# Patient Record
Sex: FEMALE | Race: WHITE | Employment: FULL TIME | URBAN - METROPOLITAN AREA
[De-identification: names, ages, dates, MRNs, and addresses within clinical notes are randomized per-mention and may not be internally consistent; named-entity substitution may affect disease eponyms.]

---

## 2018-07-19 ENCOUNTER — OFFICE VISIT (OUTPATIENT)
Dept: DERMATOLOGY | Facility: CLINIC | Age: 23
End: 2018-07-19
Payer: COMMERCIAL

## 2018-07-19 DIAGNOSIS — Z12.83 SKIN CANCER SCREENING: ICD-10-CM

## 2018-07-19 DIAGNOSIS — L24.9 IRRITANT DERMATITIS: ICD-10-CM

## 2018-07-19 DIAGNOSIS — D48.5 NEOPLASM OF UNCERTAIN BEHAVIOR OF SKIN: Primary | ICD-10-CM

## 2018-07-19 DIAGNOSIS — D22.9 MULTIPLE PIGMENTED NEVI: ICD-10-CM

## 2018-07-19 RX ORDER — DESVENLAFAXINE 100 MG/1
100 TABLET, EXTENDED RELEASE ORAL DAILY
COMMUNITY

## 2018-07-19 RX ORDER — DESONIDE 0.5 MG/G
OINTMENT TOPICAL 2 TIMES DAILY
Qty: 60 G | Refills: 0 | Status: SHIPPED | OUTPATIENT
Start: 2018-07-19 | End: 2018-11-15

## 2018-07-19 ASSESSMENT — PAIN SCALES - GENERAL
PAINLEVEL: MODERATE PAIN (4)
PAINLEVEL: NO PAIN (0)

## 2018-07-19 NOTE — LETTER
7/19/2018       RE: Clarisa Garner  [address]  [facility] [address]     Dear Colleague,    Thank you for referring your patient, Clarisa Garner, to the [facility] DERMATOLOGY at [facility]. Please see a copy of my visit note below.    [facility] Dermatology Note      Dermatology Problem List:  1. Last total body skin exam: 7/19/2018  2. Dermatitis, axillae, desonide 0.05% ointment  3. NUB, right axilla- s/p bx 7/19/2018    Encounter Date: Jul 19, 2018    CC:  Chief Complaint   Patient presents with     Derm Problem     Clarisa is here for a mole check and also notes an irritation under her arms. She states that the irritation started around 3 weeks ago.           History of Present Illness:  Ms. Clarisa Garner is a 23 year old female, with no family history of skin cancer, who presents in self referral for a mole check as a new patient. She admits to using a tanning bed twice in high school and an average amount of sun exposure over her life time.     Today the patient reports a lesion of concern under her arms. She reports this mole has gotten more red and raised recently. This changed occurred roughly 3 weeks ago. She also reports her arm pits have gotten red and itchy recently well. She also states they have started to smell more than usual. She denies starting any new beauty products or deodorants.     Otherwise the patient reports no additional painful, bleeding, nonhealing or pruritic lesions and denies any new or changing moles.    Past Medical History:   There is no problem list on file for this patient.    History reviewed. No pertinent past medical history.  History reviewed. No pertinent surgical history.    Social History:  The patient works in [employer]. She recently graduated from [email or web address] in June with degrees in political science and women's studies..  The patient admits to using a tanning bed twice in high school. Reports she has  gotten an average amount of sun exposure over her lifetime.     Family History:  There is no family history of skin cancer.    Medications:  Current Outpatient Prescriptions   Medication Sig Dispense Refill     desvenlafaxine (KHEDEZLA) 100 MG 24 hr tablet Take 100 mg by mouth daily       TRAZODONE HCL PO Take 150 mg by mouth At Bedtime         Not on File    Review of Systems:  -Skin/Heme New Pt: The patient denies frequent sun exposure.  -Constitutional: The patient is feeling generally well   -Skin: As above in HPI. No additional skin concerns.    Physical exam:  Vitals: There were no vitals taken for this visit.  GEN: This is a well developed, well-nourished female in no acute distress, in a pleasant mood.    Answers for HPI/ROS submitted by the patient on 7/19/2018   PHQ-2 Score: 2  SKIN: Total skin excluding the undergarment areas was performed. The exam included the head/face, neck, both arms, chest, back, abdomen, both legs, digits and/or nails.   -  There are well circumscribed, symmetric tan to brown pigmented macules and papules on the trunk and extremities.   - There are pink eczematous scaly plaques axillae bilaterally   - There is  5 mm brown and pink papule on the right medial axilla    - Negative woods lamp exam   - No other lesions of concern on areas examined.       Impression/Plan:  1. Neoplasm of uncertain behavior on the right medial axilla. The differential diagnosis includes inflamed nevus vs dysplastic nevus vs other     Shave biopsy:  After discussion of benefits and risks including but not limited to bleeding/bruising, pain/swelling, infection, scar, incomplete removal, nerve damage/numbness, recurrence, and non-diagnostic biopsy, written consent, verbal consent and photographs were obtained. Time-out was performed. The area was cleaned with isopropyl alcohol.  was injected to obtain adequate anesthesia of the lesion on the right medial axilla. 0.5ml of 1% lidocaine was injected to obtain  adequate anesthesia. A  shave biopsy was performed. Hemostasis was achieved with aluminium chloride. Vaseline and a sterile dressing were applied. The patient tolerated the procedure and no complications were noted. The patient was provided with verbal and written post care instructions.     2. Frictional irritant dermatitis, axilla     Start desonide 0.05% ointment, apply to affected areas under arms and arm pits BID for two weeks. Take a one week break before restarting another two week cycle as needed. Steroid ed given.     Discussed using unscented moisturizers and emollients regularly after showering    3. Multiple clinically benign nevi on trunk and extremities    ABCD's of melanoma were reviewed with patient and handout provided.     Sun precaution was advised including the use of sun screens of SPF 30 or higher, sun protective clothing, and avoidance of tanning beds.    Follow-up in 1 year, earlier pending biopsy results or for new or changing lesions. Pt defers follow up for irritant dermatitis at this time.       Staff Involved:  Scribe/Staff    Scribe Disclosure:   Lydia OLSON, am serving as a scribe to document services personally performed by Pushpa Amin PA-C, based on data collection and the provider's statements to me.    Provider Disclosure:   The documentation recorded by the scribe accurately reflects the services I personally performed and the decisions made by me.    All risks, benefits and alternatives were discussed with patient.  Patient is in agreement and understands the assessment and plan.  All questions were answered.  Sun Screen Education was given.   Return to Clini cannually or sooner as needed.   Pushpa Amin PA-C   [facility]               Again, thank you for allowing me to participate in the care of your patient.      Sincerely,    Pushpa Amin PA-C

## 2018-07-19 NOTE — PROGRESS NOTES
[facility] Dermatology Note      Dermatology Problem List:  1. Last total body skin exam: 7/19/2018  2. Dermatitis, axillae, desonide 0.05% ointment  3. NUB, right axilla- s/p bx 7/19/2018    Encounter Date: Jul 19, 2018    CC:  Chief Complaint   Patient presents with     Derm Problem     Clarisa is here for a mole check and also notes an irritation under her arms. She states that the irritation started around 3 weeks ago.           History of Present Illness:  Ms. Clarisa Garner is a 23 year old female, with no family history of skin cancer, who presents in self referral for a mole check as a new patient. She admits to using a tanning bed twice in high school and an average amount of sun exposure over her life time.     Today the patient reports a lesion of concern under her arms. She reports this mole has gotten more red and raised recently. This changed occurred roughly 3 weeks ago. She also reports her arm pits have gotten red and itchy recently well. She also states they have started to smell more than usual. She denies starting any new beauty products or deodorants.     Otherwise the patient reports no additional painful, bleeding, nonhealing or pruritic lesions and denies any new or changing moles.    Past Medical History:   There is no problem list on file for this patient.    History reviewed. No pertinent past medical history.  History reviewed. No pertinent surgical history.    Social History:  The patient works in [employer]. She recently graduated from [employer] in June with degrees in political science and women's studies..  The patient admits to using a tanning bed twice in high school. Reports she has gotten an average amount of sun exposure over her lifetime.     Family History:  There is no family history of skin cancer.    Medications:  Current Outpatient Prescriptions   Medication Sig Dispense Refill     desvenlafaxine (KHEDEZLA) 100 MG 24 hr tablet Take 100 mg by mouth daily        TRAZODONE HCL PO Take 150 mg by mouth At Bedtime         Not on File    Review of Systems:  -Skin/Heme New Pt: The patient denies frequent sun exposure.  -Constitutional: The patient is feeling generally well   -Skin: As above in HPI. No additional skin concerns.    Physical exam:  Vitals: There were no vitals taken for this visit.  GEN: This is a well developed, well-nourished female in no acute distress, in a pleasant mood.    Answers for HPI/ROS submitted by the patient on 7/19/2018   PHQ-2 Score: 2  SKIN: Total skin excluding the undergarment areas was performed. The exam included the head/face, neck, both arms, chest, back, abdomen, both legs, digits and/or nails.   -  There are well circumscribed, symmetric tan to brown pigmented macules and papules on the trunk and extremities.   - There are pink eczematous scaly plaques axillae bilaterally   - There is  5 mm brown and pink papule on the right medial axilla    - Negative woods lamp exam   - No other lesions of concern on areas examined.       Impression/Plan:  1. Neoplasm of uncertain behavior on the right medial axilla. The differential diagnosis includes inflamed nevus vs dysplastic nevus vs other     Shave biopsy:  After discussion of benefits and risks including but not limited to bleeding/bruising, pain/swelling, infection, scar, incomplete removal, nerve damage/numbness, recurrence, and non-diagnostic biopsy, written consent, verbal consent and photographs were obtained. Time-out was performed. The area was cleaned with isopropyl alcohol.  was injected to obtain adequate anesthesia of the lesion on the right medial axilla. 0.5ml of 1% lidocaine was injected to obtain adequate anesthesia. A  shave biopsy was performed. Hemostasis was achieved with aluminium chloride. Vaseline and a sterile dressing were applied. The patient tolerated the procedure and no complications were noted. The patient was provided with verbal and written post care  instructions.     2. Frictional irritant dermatitis, axilla     Start desonide 0.05% ointment, apply to affected areas under arms and arm pits BID for two weeks. Take a one week break before restarting another two week cycle as needed. Steroid ed given.     Discussed using unscented moisturizers and emollients regularly after showering    3. Multiple clinically benign nevi on trunk and extremities    ABCD's of melanoma were reviewed with patient and handout provided.     Sun precaution was advised including the use of sun screens of SPF 30 or higher, sun protective clothing, and avoidance of tanning beds.    Follow-up in 1 year, earlier pending biopsy results or for new or changing lesions. Pt defers follow up for irritant dermatitis at this time.       Staff Involved:  Scribe/Staff    Scribe Disclosure:   ILydia, am serving as a scribe to document services personally performed by Pushpa Amin PA-C, based on data collection and the provider's statements to me.    Provider Disclosure:   The documentation recorded by the scribe accurately reflects the services I personally performed and the decisions made by me.    All risks, benefits and alternatives were discussed with patient.  Patient is in agreement and understands the assessment and plan.  All questions were answered.  Sun Screen Education was given.   Return to Clini cannually or sooner as needed.   Pushpa Amin PA-C   [facility]

## 2018-07-19 NOTE — MR AVS SNAPSHOT
After Visit Summary   7/19/2018    Clarisa Garner    MRN: [id number]           Patient Information     Date Of Birth          1995        Visit Information        Provider Department      7/19/2018 11:30 AM Pushpa Amin PA-C [facility] Dermatology        Today's Diagnoses     Neoplasm of uncertain behavior of skin    -  1    Skin cancer screening        Multiple pigmented nevi        Irritant dermatitis          Care Instructions    Wound Care After a Biopsy    What is a skin biopsy?  A skin biopsy allows the doctor to examine a very small piece of tissue under the microscope to determine the diagnosis and the best treatment for the skin condition. A local anesthetic (numbing medicine)  is injected with a very small needle into the skin area to be tested. A small piece of skin is taken from the area. Sometimes a suture (stitch) is used.     What are the risks of a skin biopsy?  I will experience scar, bleeding, swelling, pain, crusting and redness. I may experience incomplete removal or recurrence. Risks of this procedure are excessive bleeding, bruising, infection, nerve damage, numbness, thick (hypertrophic or keloidal) scar and non-diagnostic biopsy.    How should I care for my wound for the first 24 hours?    Keep the wound dry and covered for 24 hours    If it bleeds, hold direct pressure on the area for 15 minutes. If bleeding does not stop then go to the emergency room    Avoid strenuous exercise the first 1-2 days or as your doctor instructs you    How should I care for the wound after 24 hours?    After 24 hours, remove the bandage    You may bathe or shower as normal    If you had a scalp biopsy, you can shampoo as usual and can use shower water to clean the biopsy site daily    Clean the wound twice a day with gentle soap and water    Do not scrub, be gentle    Apply white petroleum/Vaseline after cleaning the wound with a cotton swab or a clean finger, and keep the site  covered with a Bandaid /bandage. Bandages are not necessary with a scalp biopsy    If you are unable to cover the site with a Bandaid /bandage, re-apply ointment 2-3 times a day to keep the site moist. Moisture will help with healing    Avoid strenuous activity for first 1-2 days    Avoid lakes, rivers, pools, and oceans until the stitches are removed or the site is healed    How do I clean my wound?    Wash hands thoroughly with soap or use hand  before all wound care    Clean the wound with gentle soap and water    Apply white petroleum/Vaseline  to wound after it is clean    Replace the Bandaid /bandage to keep the wound covered for the first few days or as instructed by your doctor    If you had a scalp biopsy, warm shower water to the area on a daily basis should suffice    What should I use to clean my wound?     Cotton-tipped applicators (Qtips )    White petroleum jelly (Vaseline ). Use a clean new container and use Q-tips to apply.    Bandaids   as needed    Gentle soap     How should I care for my wound long term?    Do not get your wound dirty    Keep up with wound care for one week or until the area is healed.    A small scab will form and fall off by itself when the area is completely healed. The area will be red and will become pink in color as it heals. Sun protection is very important for how your scar will turn out. Sunscreen with an SPF 30 or greater is recommended once the area is healed.    If you have stitches, stitches need to be removed in  days. You may return to our clinic for this or you may have it done locally at your doctor s office.    You should have some soreness but it should be mild and slowly go away over several days. Talk to your doctor about using tylenol for pain,    When should I call my doctor?  If you have increased:     Pain or swelling    Pus or drainage (clear or slightly yellow drainage is ok)    Temperature over 100F    Spreading redness or warmth around  wound    When will I hear about my results?  The biopsy results can take 2-3 weeks to come back. The clinic will call you with the results, send you a [facility] message, or have you schedule a follow-up clinic or phone time to discuss the results. Contact our clinics if you do not hear from us in 3 weeks.     Who should I call with questions?    [facility]: [phone]     [facility]: [phone]    For urgent needs outside of business hours call the [facility] at [phone] and ask for the dermatology resident on call              Follow-ups after your visit        Who to contact     Please call your clinic at [phone] to:    Ask questions about your health    Make or cancel appointments    Discuss your medicines    Learn about your test results    Speak to your doctor            Additional Information About Your Visit        MyChart Information     [employer]Med is an electronic gateway that provides easy, online access to your medical records. With [employer], you can request a clinic appointment, read your test results, renew a prescription or communicate with your care team.     To sign up for [employer] visit the website at [email or web address]   You will be asked to enter the access code listed below, as well as some personal information. Please follow the directions to create your username and password.     Your access code is: [email or web address]  Expires: 10/3/2018  5:16 PM     Your access code will  in 90 days. If you need help or a new code, please contact your [facility] or call [phone] for assistance.        Care EveryWhere ID     This is your Care EveryWhere ID. This could be used by other organizations to access your [facility] medical records  [id number]         Blood Pressure from Last 3 Encounters:   No data found for BP    Weight from Last 3 Encounters:   No data found for Wt              We  Performed the Following     BIOPSY SKIN/SUBQ/MUC MEM, SINGLE LESION     Dermatological path order and indications          Today's Medication Changes          These changes are accurate as of 7/19/18 11:59 AM.  If you have any questions, ask your nurse or doctor.               Start taking these medicines.        Dose/Directions    desonide 0.05 % ointment   Commonly known as:  DESOWEN   Used for:  Irritant dermatitis   Started by:  Pushpa Amin PA-C        Apply topically 2 times daily To affected areas for two weeks. Take a week break and restart as needed.   Quantity:  60 g   Refills:  0            Where to get your medicines      These medications were sent to [facility] [address] [place] [address] AT [address] [address]    Hours:  24-hours Phone:  [phone]     desonide 0.05 % ointment                Primary Care Provider    None Specified       No primary provider on file.        Equal Access to Services     [facility] : Hadii francisco garzao Shannon, waaxda lujuan, qaybta kaalmada adeesperanzayajaymie, ollie negrete . So [facility] [phone].    ATENCIÓN: Si habla español, tiene a yuan disposición servicios gratuitos de asistencia lingüística. Olaf al [phone].    We comply with applicable federal civil rights laws and [place] laws. We do not discriminate on the basis of race, color, national origin, age, disability, sex, sexual orientation, or gender identity.            Thank you!     Thank you for choosing [facility] DERMATOLOGY  for your care. Our goal is always to provide you with excellent care. Hearing back from our patients is one way we can continue to improve our services. Please take a few minutes to complete the written survey that you may receive in the mail after your visit with us. Thank you!             Your Updated Medication List - Protect others around you: Learn how to safely use, store and throw  away your medicines at www.disposemymeds.org.          This list is accurate as of 7/19/18 11:59 AM.  Always use your most recent med list.                   Brand Name Dispense Instructions for use Diagnosis    desonide 0.05 % ointment    DESOWEN    60 g    Apply topically 2 times daily To affected areas for two weeks. Take a week break and restart as needed.    Irritant dermatitis       desvenlafaxine 100 MG 24 hr tablet    KHEDEZLA     Take 100 mg by mouth daily        TRAZODONE HCL PO      Take 150 mg by mouth At Bedtime

## 2018-07-19 NOTE — NURSING NOTE
Chief Complaint   Patient presents with     Derm Problem     Clarisa is here for a mole check and also notes an irritation under her arms. She states that the irritation started around 3 weeks ago.      Susan Aguilar, EMT

## 2018-07-19 NOTE — PATIENT INSTRUCTIONS

## 2018-07-24 LAB — COPATH REPORT: NORMAL

## 2018-07-25 ENCOUNTER — TELEPHONE (OUTPATIENT)
Dept: DERMATOLOGY | Facility: CLINIC | Age: 23
End: 2018-07-25

## 2018-07-25 NOTE — TELEPHONE ENCOUNTER
I spoke to Clarisa Garner and gave results. Patient understood and had no further questions or concerns.

## 2018-07-25 NOTE — TELEPHONE ENCOUNTER
[facility] Call Center    Phone Message    May a detailed message be left on voicemail: yes    Reason for Call: Other: Pt got a call saying her biopsy results were back. She said to call her back today either before 11:30am or after 2pm.      Action Taken: Message routed to:  [facility] Dermatology

## 2018-10-17 ENCOUNTER — OFFICE VISIT (OUTPATIENT)
Dept: DERMATOLOGY | Facility: CLINIC | Age: 23
End: 2018-10-17
Payer: COMMERCIAL

## 2018-10-17 DIAGNOSIS — L85.3 XEROSIS OF SKIN: ICD-10-CM

## 2018-10-17 DIAGNOSIS — L24.9 IRRITANT DERMATITIS: Primary | ICD-10-CM

## 2018-10-17 ASSESSMENT — PAIN SCALES - GENERAL: PAINLEVEL: NO PAIN (0)

## 2018-10-17 NOTE — MR AVS SNAPSHOT
After Visit Summary   10/17/2018    Clarisa Garner    MRN: [id number]           Patient Information     Date Of Birth          1995        Visit Information        Provider Department      10/17/2018 6:15 PM Pushpa Amin PA-C [facility] Dermatology        Today's Diagnoses     Irritant dermatitis    -  1    Xerosis of skin           Follow-ups after your visit        Follow-up notes from your care team     Return if symptoms worsen or fail to improve.      Who to contact     Please call your clinic at [phone] to:    Ask questions about your health    Make or cancel appointments    Discuss your medicines    Learn about your test results    Speak to your doctor            Additional Information About Your Visit        MyChart Information     [employer] is an electronic gateway that provides easy, online access to your medical records. With [employer], you can request a clinic appointment, read your test results, renew a prescription or communicate with your care team.     To sign up for [employer] visit the website at [email or web address]   You will be asked to enter the access code listed below, as well as some personal information. Please follow the directions to create your username and password.     Your access code is: [email or web address]  Expires: 2018  9:12 AM     Your access code will  in 90 days. If you need help or a new code, please contact your [facility] or call [phone] for assistance.        Care EveryWhere ID     This is your Care EveryWhere ID. This could be used by other organizations to access your [place] medical records  [id number]         Blood Pressure from Last 3 Encounters:   No data found for BP    Weight from Last 3 Encounters:   No data found for Wt              Today, you had the following     No orders found for display       Primary Care Provider Fax #    Physician No Ref-Primary [phone]       No address on  file        Equal Access to Services     [facility] : Hadii aad ku hadfaustinonydia Shannon, wafrannyda luqadaha, qaybta lydiahenriquejaymie sandoval, olile duque. So [facility] [phone].    ATENCIÓN: Si habla español, tiene a yuan disposición servicios gratuitos de asistencia lingüística. Llame al [phone].    We comply with applicable federal civil rights laws and [place] laws. We do not discriminate on the basis of race, color, national origin, age, disability, sex, sexual orientation, or gender identity.            Thank you!     Thank you for choosing [facility] DERMATOLOGY  for your care. Our goal is always to provide you with excellent care. Hearing back from our patients is one way we can continue to improve our services. Please take a few minutes to complete the written survey that you may receive in the mail after your visit with us. Thank you!             Your Updated Medication List - Protect others around you: Learn how to safely use, store and throw away your medicines at www.disposemymeds.org.          This list is accurate as of 10/17/18 11:59 PM.  Always use your most recent med list.                   Brand Name Dispense Instructions for use Diagnosis    AMBIEN PO           desonide 0.05 % ointment    DESOWEN    60 g    Apply topically 2 times daily To affected areas for two weeks. Take a week break and restart as needed.    Irritant dermatitis       desvenlafaxine 100 MG 24 hr tablet    KHEDEZLA     Take 100 mg by mouth daily        levonorgestrel 20 MCG/24HR IUD    MIRENA     1 each by Intrauterine route once L arm        TRAZODONE HCL PO      Take 150 mg by mouth At Bedtime

## 2018-10-17 NOTE — LETTER
Date:October 19, 2018      Patient was self referred, no letter generated. Do not send.        [facility] Health Information

## 2018-10-17 NOTE — NURSING NOTE
Dermatology Rooming Note    Clarisa Garner's goals for this visit include:   Chief Complaint   Patient presents with     Derm Problem     Clarisa would like to discuss the dryness and irritation on her face and neck, she notes this problem is worse in the cold weather.      Ella Casanova LPN

## 2018-10-17 NOTE — LETTER
10/17/2018       RE: Clarisa Garner  [address]  [place] [address]     Dear Colleague,    Thank you for referring your patient, Clarisa Garner, to the [facility] DERMATOLOGY at [facility]. Please see a copy of my visit note below.    [facility] Dermatology Note      Dermatology Problem List:  1. Last total body skin exam: 7/19/2018  2. Dermatitis, axillae, desonide 0.05% ointment    Encounter Date: Oct 17, 2018    CC:  Chief Complaint   Patient presents with     Derm Problem     Clarisa would like to discuss the dryness and irritation on her face and neck, she notes this problem is worse in the cold weather.       History of Present Illness:  Ms. Clarisa Garner is a 23 year old female who presents today for an eczema evaluation. The patient was last seen in the dermatology clinic on 07/19/18 during which a NUB was biopsied from her right medial axilla and she started desonide 0.05% ointment regarding frictional irritant dermatitis. The biopsy returned from pathology as an intradermal melanocytic nevus.    Today she reports that she has been experiencing very dry skin with the recent weather change. She primarily notes dryness and irritation on her neck. Often her eyes will swell up when this happens. She has stopped using many of her face products and has started sleeping with a humidifier. She reports a similar thing happened to her last winter. She currently uses a tea tree and lemon grass shampoo and condition for the last 3-4 months. She recently switched her face wash to cetaphil gentle cleanser from an [employer] product. She switched washes about a week ago with only minimal improvement. She recently started jojoba oil and coconut oil moisturizers. These ingredients were found in the [employer] products- and she used that product for a long time. She reports that she does not use make up when her skin is this dry.     Otherwise the patient  reports no additional painful, bleeding, nonhealing or pruritic lesions and denies any new or changing moles.    Past Medical History:   There is no problem list on file for this patient.    No past medical history on file.  No past surgical history on file.    Social History:  The patient works in [email or web address]. She recently graduated from [employer] in June with degrees in [employer] science and women's studies..  The patient admits to using a tanning bed twice in high school. Reports she has gotten an average amount of sun exposure over her lifetime.     Family History:  There is no family history of skin cancer.    Medications:  Current Outpatient Prescriptions   Medication Sig Dispense Refill     desonide (DESOWEN) 0.05 % ointment Apply topically 2 times daily To affected areas for two weeks. Take a week break and restart as needed. 60 g 0     desvenlafaxine (KHEDEZLA) 100 MG 24 hr tablet Take 100 mg by mouth daily       TRAZODONE HCL PO Take 150 mg by mouth At Bedtime         No Known Allergies    Review of Systems:  -Skin/Heme New Pt: The patient denies frequent sun exposure.  -Constitutional: The patient is feeling generally well   -Skin: As above in HPI. No additional skin concerns.    Physical exam:  Vitals: There were no vitals taken for this visit.  GEN: This is a well developed, well-nourished female in no acute distress, in a pleasant mood.    SKIN: Sun-exposed skin, which includes the head/face, neck, both arms, digits, and/or nails was examined. Significant for:     - Erythema within folds of anterior neck    - Mild xerosis and erythema to lateral cheeks  - No other lesions of concern on areas examined.       Impression/Plan:  1. Irritant dermatitis, neck   - Recommend using Cerave or Cetaphil gentle cleansers and moisturizers. Samples of Vanicream products given today.  - Pt instructed to isolate use of jojoba oil and coconut butter product to try and figure out if one of these products is causing skin  eruption, on forearms (USE TEST)  - May consider patch testing to further narrow down possible skin irritants vs potential allergies.   - OK to use desonide 0.05% ointment to affected areas on neck (pt has this). Until it resolves, approximately 2 weeks.     Follow-up PRN for new or worsening symptoms , pt defers f/u at this time.       Staff Involved:  Scribe/Staff    Scribe Disclosure:   I, Lydia Ho, am serving as a scribe to document services personally performed by Pushpa Amin PA-C, based on data collection and the provider's statements to me.    Provider Disclosure:   The documentation recorded by the scribe accurately reflects the services I personally performed and the decisions made by me.    All risks, benefits and alternatives were discussed with patient.  Patient is in agreement and understands the assessment and plan.  All questions were answered.  Sun Screen Education was given.   Return to Clinic as needed.   Pushpa mAin PA-C   [facility]         Again, thank you for allowing me to participate in the care of your patient.      Sincerely,    Pushpa Amin PA-C

## 2018-10-17 NOTE — PROGRESS NOTES
[facility] Dermatology Note      Dermatology Problem List:  1. Last total body skin exam: 7/19/2018  2. Dermatitis, axillae, desonide 0.05% ointment    Encounter Date: Oct 17, 2018    CC:  Chief Complaint   Patient presents with     Derm Problem     Clarisa would like to discuss the dryness and irritation on her face and neck, she notes this problem is worse in the cold weather.       History of Present Illness:  Ms. Clarisa Garner is a 23 year old female who presents today for an eczema evaluation. The patient was last seen in the dermatology clinic on 07/19/18 during which a NUB was biopsied from her right medial axilla and she started desonide 0.05% ointment regarding frictional irritant dermatitis. The biopsy returned from pathology as an intradermal melanocytic nevus.    Today she reports that she has been experiencing very dry skin with the recent weather change. She primarily notes dryness and irritation on her neck. Often her eyes will swell up when this happens. She has stopped using many of her face products and has started sleeping with a humidifier. She reports a similar thing happened to her last winter. She currently uses a tea tree and lemon grass shampoo and condition for the last 3-4 months. She recently switched her face wash to cetaphil gentle cleanser from an [employer] product. She switched washes about a week ago with only minimal improvement. She recently started jojoba oil and coconut oil moisturizers. These ingredients were found in the [employer] products- and she used that product for a long time. She reports that she does not use make up when her skin is this dry.     Otherwise the patient reports no additional painful, bleeding, nonhealing or pruritic lesions and denies any new or changing moles.    Past Medical History:   There is no problem list on file for this patient.    No past medical history on file.  No past surgical history on file.    Social History:  The  patient works in [employer]. She recently graduated from [employer] in June with degrees in [email or web address] science and women's studies..  The patient admits to using a tanning bed twice in high school. Reports she has gotten an average amount of sun exposure over her lifetime.     Family History:  There is no family history of skin cancer.    Medications:  Current Outpatient Prescriptions   Medication Sig Dispense Refill     desonide (DESOWEN) 0.05 % ointment Apply topically 2 times daily To affected areas for two weeks. Take a week break and restart as needed. 60 g 0     desvenlafaxine (KHEDEZLA) 100 MG 24 hr tablet Take 100 mg by mouth daily       TRAZODONE HCL PO Take 150 mg by mouth At Bedtime         No Known Allergies    Review of Systems:  -Skin/Heme New Pt: The patient denies frequent sun exposure.  -Constitutional: The patient is feeling generally well   -Skin: As above in HPI. No additional skin concerns.    Physical exam:  Vitals: There were no vitals taken for this visit.  GEN: This is a well developed, well-nourished female in no acute distress, in a pleasant mood.    SKIN: Sun-exposed skin, which includes the head/face, neck, both arms, digits, and/or nails was examined. Significant for:     - Erythema within folds of anterior neck    - Mild xerosis and erythema to lateral cheeks  - No other lesions of concern on areas examined.       Impression/Plan:  1. Irritant dermatitis, neck   - Recommend using Cerave or Cetaphil gentle cleansers and moisturizers. Samples of Vanicream products given today.  - Pt instructed to isolate use of jojoba oil and coconut butter product to try and figure out if one of these products is causing skin eruption, on forearms (USE TEST)  - May consider patch testing to further narrow down possible skin irritants vs potential allergies.   - OK to use desonide 0.05% ointment to affected areas on neck (pt has this). Until it resolves, approximately 2 weeks.     Follow-up PRN for new or  worsening symptoms , pt defers f/u at this time.       Staff Involved:  Scribe/Staff    Scribe Disclosure:   I, Lydia Ho, am serving as a scribe to document services personally performed by Pushpa Amin PA-C, based on data collection and the provider's statements to me.    Provider Disclosure:   The documentation recorded by the scribe accurately reflects the services I personally performed and the decisions made by me.    All risks, benefits and alternatives were discussed with patient.  Patient is in agreement and understands the assessment and plan.  All questions were answered.  Sun Screen Education was given.   Return to Clinic as needed.   Pushpa Amin PA-C   [facility]

## 2018-11-15 ENCOUNTER — OFFICE VISIT (OUTPATIENT)
Dept: FAMILY MEDICINE | Facility: CLINIC | Age: 23
End: 2018-11-15
Payer: COMMERCIAL

## 2018-11-15 VITALS
DIASTOLIC BLOOD PRESSURE: 75 MMHG | RESPIRATION RATE: 16 BRPM | WEIGHT: 139 LBS | SYSTOLIC BLOOD PRESSURE: 109 MMHG | HEART RATE: 99 BPM | OXYGEN SATURATION: 98 % | BODY MASS INDEX: 24.63 KG/M2 | HEIGHT: 63 IN | TEMPERATURE: 98 F

## 2018-11-15 DIAGNOSIS — J02.9 SORE THROAT: Primary | ICD-10-CM

## 2018-11-15 DIAGNOSIS — K13.0 LIP ULCER: ICD-10-CM

## 2018-11-15 LAB
BASOPHILS # BLD AUTO: 0 10E9/L (ref 0–0.2)
BASOPHILS NFR BLD AUTO: 0.2 %
DEPRECATED S PYO AG THROAT QL EIA: NORMAL
DIFFERENTIAL METHOD BLD: NORMAL
EOSINOPHIL # BLD AUTO: 0.1 10E9/L (ref 0–0.7)
EOSINOPHIL NFR BLD AUTO: 0.9 %
ERYTHROCYTE [DISTWIDTH] IN BLOOD BY AUTOMATED COUNT: 12.2 % (ref 10–15)
HCT VFR BLD AUTO: 39 % (ref 35–47)
HETEROPH AB SER QL: NEGATIVE
HGB BLD-MCNC: 13.1 G/DL (ref 11.7–15.7)
LYMPHOCYTES # BLD AUTO: 1.1 10E9/L (ref 0.8–5.3)
LYMPHOCYTES NFR BLD AUTO: 16.5 %
MCH RBC QN AUTO: 31.3 PG (ref 26.5–33)
MCHC RBC AUTO-ENTMCNC: 33.6 G/DL (ref 31.5–36.5)
MCV RBC AUTO: 93 FL (ref 78–100)
MONOCYTES # BLD AUTO: 0.9 10E9/L (ref 0–1.3)
MONOCYTES NFR BLD AUTO: 13.4 %
NEUTROPHILS # BLD AUTO: 4.5 10E9/L (ref 1.6–8.3)
NEUTROPHILS NFR BLD AUTO: 69 %
PLATELET # BLD AUTO: 199 10E9/L (ref 150–450)
RBC # BLD AUTO: 4.18 10E12/L (ref 3.8–5.2)
SPECIMEN SOURCE: NORMAL
WBC # BLD AUTO: 6.5 10E9/L (ref 4–11)

## 2018-11-15 PROCEDURE — 87880 STREP A ASSAY W/OPTIC: CPT | Performed by: PHYSICIAN ASSISTANT

## 2018-11-15 PROCEDURE — 36415 COLL VENOUS BLD VENIPUNCTURE: CPT | Performed by: PHYSICIAN ASSISTANT

## 2018-11-15 PROCEDURE — 87081 CULTURE SCREEN ONLY: CPT | Performed by: PHYSICIAN ASSISTANT

## 2018-11-15 PROCEDURE — 86308 HETEROPHILE ANTIBODY SCREEN: CPT | Performed by: PHYSICIAN ASSISTANT

## 2018-11-15 PROCEDURE — 99213 OFFICE O/P EST LOW 20 MIN: CPT | Performed by: PHYSICIAN ASSISTANT

## 2018-11-15 PROCEDURE — 85025 COMPLETE CBC W/AUTO DIFF WBC: CPT | Performed by: PHYSICIAN ASSISTANT

## 2018-11-15 NOTE — PROGRESS NOTES
"  SUBJECTIVE:   Clarisa Garner is a 23 year old female who presents to clinic today for the following health issues:      Concern - swollen tonsils and sore inside mouth  Onset: 1 week     Description:   Bilateral tonsils swollen and bilateral sores inside mout     Intensity: moderate    Progression of Symptoms:  same    Accompanying Signs & Symptoms:  Headache and sore throat     Previous history of similar problem:   no    Precipitating factors:   Worsened by: nothing     Alleviating factors:  Improved by:     Therapies Tried and outcome: musinex, and ibuprofen         Problem list and histories reviewed & adjusted, as indicated.  Additional history: ST for about a week, but has been getting some shallow ulcers in mouth and lip that are a bit more painful.  Motrin and tylenol help    BP Readings from Last 3 Encounters:   11/15/18 109/75    Wt Readings from Last 3 Encounters:   11/15/18 139 lb (63 kg)                    Reviewed and updated as needed this visit by clinical staff       Reviewed and updated as needed this visit by Provider         ROS:  Constitutional, HEENT, cardiovascular, pulmonary, gi and gu systems are negative, except as otherwise noted.    OBJECTIVE:     /75  Pulse 99  Temp 98  F (36.7  C) (Oral)  Resp 16  Ht 5' 3\" (1.6 m)  Wt 139 lb (63 kg)  SpO2 98%  BMI 24.62 kg/m2  Body mass index is 24.62 kg/(m^2).  GENERAL: alert and no distress  EYES: Eyes grossly normal to inspection  HENT: normal cephalic/atraumatic, ear canals and TM's normal, nose and mouth without ulcers or lesions, tonsillar hypertrophy, tonsillar erythema and several shallow ulcers including one lower lip  NECK: cervical adenopathy   RESP: lungs clear to auscultation - no rales, rhonchi or wheezes  CV: regular rate and rhythm, normal S1 S2, no S3 or S4, no murmur, click or rub, no peripheral edema and peripheral pulses strong  PSYCH: mentation appears normal, affect normal/bright    Diagnostic Test Results:  Results " for orders placed or performed in visit on 11/15/18 (from the past 24 hour(s))   Rapid strep screen   Result Value Ref Range    Specimen Description Throat     Rapid Strep A Screen       NEGATIVE: No Group A streptococcal antigen detected by immunoassay, await culture report.   Mononucleosis screen   Result Value Ref Range    Mononucleosis Screen Negative NEG^Negative   CBC with platelets differential   Result Value Ref Range    WBC 6.5 4.0 - 11.0 10e9/L    RBC Count 4.18 3.8 - 5.2 10e12/L    Hemoglobin 13.1 11.7 - 15.7 g/dL    Hematocrit 39.0 35.0 - 47.0 %    MCV 93 78 - 100 fl    MCH 31.3 26.5 - 33.0 pg    MCHC 33.6 31.5 - 36.5 g/dL    RDW 12.2 10.0 - 15.0 %    Platelet Count 199 150 - 450 10e9/L    % Neutrophils 69.0 %    % Lymphocytes 16.5 %    % Monocytes 13.4 %    % Eosinophils 0.9 %    % Basophils 0.2 %    Absolute Neutrophil 4.5 1.6 - 8.3 10e9/L    Absolute Lymphocytes 1.1 0.8 - 5.3 10e9/L    Absolute Monocytes 0.9 0.0 - 1.3 10e9/L    Absolute Eosinophils 0.1 0.0 - 0.7 10e9/L    Absolute Basophils 0.0 0.0 - 0.2 10e9/L    Diff Method Automated Method        ASSESSMENT/PLAN:             1. Sore throat  Negative, strep, mono and normal CBC.  Most likely viral and with ulceration would think coxsackie virus or herpangina .    - Rapid strep screen  - Beta strep group A culture  - Mononucleosis screen  - CBC with platelets differential    2. Lip ulcer            Hiro Lambert PA-C  [facility]

## 2018-11-15 NOTE — PROGRESS NOTES
Please call with results.    Negative for mono and blood count normal.  Most likely other virus, especially with the mouth sores.  Would give this the weekend, if symptoms persist or worsen, would have her contact us.    Brian Lambert PA-C

## 2018-11-15 NOTE — MR AVS SNAPSHOT
"              After Visit Summary   11/15/2018    Clarisa Garner    MRN: [id number]           Patient Information     Date Of Birth          1995        Visit Information        Provider Department      11/15/2018 9:40 AM Hiro Lambert PA-C [facility]        Today's Diagnoses     Sore throat    -  1       Follow-ups after your visit        Who to contact     If you have questions or need follow up information about today's clinic visit or your schedule please contact [facility] directly at [phone].  Normal or non-critical lab and imaging results will be communicated to you by Typesafehart, letter or phone within 4 business days after the clinic has received the results. If you do not hear from us within 7 days, please contact the clinic through [employer] or phone. If you have a critical or abnormal lab result, we will notify you by phone as soon as possible.  Submit refill requests through [employer] or call your pharmacy and they will forward the refill request to us. Please allow 3 business days for your refill to be completed.          Additional Information About Your Visit        MyChart Information     [employer] lets you send messages to your doctor, view your test results, renew your prescriptions, schedule appointments and more. To sign up, go to [email or web address] . Click on \"Log in\" on the left side of the screen, which will take you to the Welcome page. Then click on \"Sign up Now\" on the right side of the page.     You will be asked to enter the access code listed below, as well as some personal information. Please follow the directions to create your username and password.     Your access code is: [email or web address]  Expires: 2018  8:12 AM     Your access code will  in 90 days. If you need help or a new code, please call your [facility] or [phone].        Care EveryWhere ID     This is your Care EveryWhere ID. This could be used by other organizations " "to access your [place] medical records  [id number]        Your Vitals Were     Pulse Temperature Respirations Height Pulse Oximetry BMI (Body Mass Index)    99 98  F (36.7  C) (Oral) 16 5' 3\" (1.6 m) 98% 24.62 kg/m2       Blood Pressure from Last 3 Encounters:   11/15/18 109/75    Weight from Last 3 Encounters:   11/15/18 139 lb (63 kg)              We Performed the Following     Beta strep group A culture     CBC with platelets differential     Mononucleosis screen     Rapid strep screen          Today's Medication Changes          These changes are accurate as of 11/15/18 10:16 AM.  If you have any questions, ask your nurse or doctor.               Stop taking these medicines if you haven't already. Please contact your care team if you have questions.     desonide 0.05 % ointment   Commonly known as:  DESOWEN   Stopped by:  Hiro Lambert PA-C           TRAZODONE HCL PO   Stopped by:  Hiro Lambert PA-C                    Primary Care Provider Fax #    Physician No Ref-Primary [phone]       No address on file        Equal Access to Services     [facility]: Hadii aad ku hadasho Soomaali, waaxda luqadaha, qaybta kaalmada adeesperanzayajaymie, ollie negrete . So [facility] [phone].    ATENCIÓN: Si habla español, tiene a yuan disposición servicios gratuitos de asistencia lingüística. Llame al [phone].    We comply with applicable federal civil rights laws and [place] laws. We do not discriminate on the basis of race, color, national origin, age, disability, sex, sexual orientation, or gender identity.            Thank you!     Thank you for choosing [facility]  for your care. Our goal is always to provide you with excellent care. Hearing back from our patients is one way we can continue to improve our services. Please take a few minutes to complete the written survey that you may receive in the mail after your visit with us. Thank you!             Your " Updated Medication List - Protect others around you: Learn how to safely use, store and throw away your medicines at www.disposemymeds.org.          This list is accurate as of 11/15/18 10:16 AM.  Always use your most recent med list.                   Brand Name Dispense Instructions for use Diagnosis    AMBIEN PO           desvenlafaxine 100 MG 24 hr tablet    KHEDEZLA     Take 100 mg by mouth daily        levonorgestrel 20 MCG/24HR IUD    MIRENA     1 each by Intrauterine route once L arm

## 2018-11-16 LAB
BACTERIA SPEC CULT: NORMAL
SPECIMEN SOURCE: NORMAL